# Patient Record
Sex: MALE | Race: WHITE | Employment: FULL TIME | URBAN - METROPOLITAN AREA
[De-identification: names, ages, dates, MRNs, and addresses within clinical notes are randomized per-mention and may not be internally consistent; named-entity substitution may affect disease eponyms.]

---

## 2022-07-21 ENCOUNTER — HOSPITAL ENCOUNTER (OUTPATIENT)
Age: 32
Setting detail: SPECIMEN
Discharge: HOME | End: 2022-07-21

## 2022-07-21 ENCOUNTER — OFFICE VISIT (OUTPATIENT)
Dept: FAMILY MEDICINE CLINIC | Age: 32
End: 2022-07-21
Payer: COMMERCIAL

## 2022-07-21 VITALS
DIASTOLIC BLOOD PRESSURE: 68 MMHG | BODY MASS INDEX: 19.93 KG/M2 | WEIGHT: 150.4 LBS | HEART RATE: 73 BPM | HEIGHT: 73 IN | SYSTOLIC BLOOD PRESSURE: 115 MMHG

## 2022-07-21 DIAGNOSIS — Z00.00 HEALTHCARE MAINTENANCE: ICD-10-CM

## 2022-07-21 DIAGNOSIS — L20.84 INTRINSIC ECZEMA: Primary | ICD-10-CM

## 2022-07-21 LAB
HEPATITIS C ANTIBODY: NONREACTIVE
HIV AG/AB: NONREACTIVE

## 2022-07-21 PROCEDURE — 99203 OFFICE O/P NEW LOW 30 MIN: CPT | Performed by: STUDENT IN AN ORGANIZED HEALTH CARE EDUCATION/TRAINING PROGRAM

## 2022-07-21 RX ORDER — TRIAMCINOLONE ACETONIDE 0.25 MG/G
OINTMENT TOPICAL 2 TIMES DAILY
Qty: 80 G | Refills: 1 | Status: SHIPPED | OUTPATIENT
Start: 2022-07-21 | End: 2022-07-28

## 2022-07-21 SDOH — ECONOMIC STABILITY: FOOD INSECURITY: WITHIN THE PAST 12 MONTHS, YOU WORRIED THAT YOUR FOOD WOULD RUN OUT BEFORE YOU GOT MONEY TO BUY MORE.: NEVER TRUE

## 2022-07-21 SDOH — ECONOMIC STABILITY: FOOD INSECURITY: WITHIN THE PAST 12 MONTHS, THE FOOD YOU BOUGHT JUST DIDN'T LAST AND YOU DIDN'T HAVE MONEY TO GET MORE.: NEVER TRUE

## 2022-07-21 ASSESSMENT — ENCOUNTER SYMPTOMS
SHORTNESS OF BREATH: 0
CONSTIPATION: 0
COUGH: 0
SINUS PRESSURE: 0
NAUSEA: 0
ABDOMINAL PAIN: 0
SINUS PAIN: 0
ROS SKIN COMMENTS: ECZEMA
VOMITING: 0
BACK PAIN: 0
WHEEZING: 0
DIARRHEA: 0
BLOOD IN STOOL: 0

## 2022-07-21 ASSESSMENT — PATIENT HEALTH QUESTIONNAIRE - PHQ9
SUM OF ALL RESPONSES TO PHQ QUESTIONS 1-9: 0
SUM OF ALL RESPONSES TO PHQ9 QUESTIONS 1 & 2: 0
2. FEELING DOWN, DEPRESSED OR HOPELESS: 0
SUM OF ALL RESPONSES TO PHQ QUESTIONS 1-9: 0
1. LITTLE INTEREST OR PLEASURE IN DOING THINGS: 0

## 2022-07-21 ASSESSMENT — SOCIAL DETERMINANTS OF HEALTH (SDOH): HOW HARD IS IT FOR YOU TO PAY FOR THE VERY BASICS LIKE FOOD, HOUSING, MEDICAL CARE, AND HEATING?: NOT HARD AT ALL

## 2022-07-21 NOTE — PROGRESS NOTES
Visit Information    Have you changed or started any medications since your last visit including any over-the-counter medicines, vitamins, or herbal medicines? no   Are you having any side effects from any of your medications? -  no  Have you stopped taking any of your medications? Is so, why? -  no    Have you seen any other physician or provider since your last visit? Yes - Records Requested  Have you had any other diagnostic tests since your last visit? No  Have you been seen in the emergency room and/or had an admission to a hospital since we last saw you? No  Have you had your routine dental cleaning in the past 6 months? no    Have you activated your [facility] account? If not, what are your barriers?  No:      Patient Care Team:  Lord Grady MD as PCP - General (Emergency Medicine)    Medical History Review  Past Medical, Family, and Social History reviewed and does not contribute to the patient presenting condition    Health Maintenance   Topic Date Due    Varicella vaccine (1 of 2 - 2-dose childhood series) Never done    Depression Screen  Never done    HIV screen  Never done    Hepatitis C screen  Never done    DTaP/Tdap/Td vaccine (1 - Tdap) Never done    COVID-19 Vaccine (3 - Booster for [place] series) 10/05/2021    Flu vaccine (1) 09/01/2022    Hepatitis A vaccine  Aged Out    Hepatitis B vaccine  Aged Out    Hib vaccine  Aged Out    Meningococcal (ACWY) vaccine  Aged Out    Pneumococcal 0-64 years Vaccine  Aged Out

## 2022-07-21 NOTE — PROGRESS NOTES
Attending Physician Statement  I have discussed the care of Aramis Munguia 33 y.o. male, including pertinent history and exam findings, with the resident Dr. Patricia Shah MD.    History and Exam:   Chief Complaint   Patient presents with    Annual Exam     New patient       No past medical history on file. No Known Allergies   I have seen and examined the patient and the key elements of the encounter have been performed by me. BP Readings from Last 3 Encounters:   07/21/22 115/68     /68 (Site: Left Upper Arm, Position: Sitting, Cuff Size: Medium Adult)   Pulse 73   Ht 6' 1\" (1.854 m)   Wt 150 lb 6.4 oz (68.2 kg)   BMI 19.84 kg/m²   No results found for: WBC, HGB, HCT, PLT, CHOL, TRIG, HDL, LDLDIRECT, ALT, AST, NA, K, CL, CREATININE, BUN, CO2, TSH, PSA, INR, GLUF, LABA1C, LABMICR  No results found for: LABPROT, LABALBU  No results found for: IRON, TIBC, FERRITIN  No results found for: LDLCALC, LDLCHOLESTEROL, LDLDIRECT  I agree with the assessment, plan and the diagnosis of    Diagnosis Orders   1. Intrinsic eczema  triamcinolone (KENALOG) 0.025 % ointment      2. Healthcare maintenance  Hepatitis C Antibody    HIV Screen       . I agree with orders as documented by the resident. More than 25 minutes spent  in face to face encounter with the patient and more than half in counseling. Patient's questions were answered. Patient Voiced understanding to the counseling. Return in about 6 months (around 1/21/2023) for Lab Work.    (GC Modifier)-Dr. Maci Mustafa MD

## 2022-07-21 NOTE — PROGRESS NOTES
Subjective: Littie Hammans is a 33 y.o. male with  has no past medical history on file. Presented to the office today for:  Chief Complaint   Patient presents with    Annual Exam     New patient       HPI  This is a 78-year-old gentleman with a history of eczema came in today to establish care. Patient recently moved from [facility] to [facility], works as a . Patient is asymptomatic, denied any scheduled medication/over-the-counter supplements, any allergies that he is familiar off. Patient denies any surgical history. Denies any family history of heart disease or diabetes. Patient is only using Kenalog for eczema. Review of Systems   Constitutional:  Negative for chills and fever. HENT:  Negative for congestion, sinus pressure and sinus pain. Respiratory:  Negative for cough, shortness of breath and wheezing. Cardiovascular:  Negative for chest pain, palpitations and leg swelling. Gastrointestinal:  Negative for abdominal pain, blood in stool, constipation, diarrhea, nausea and vomiting. Genitourinary:  Negative for difficulty urinating, flank pain and hematuria. Musculoskeletal:  Negative for arthralgias, back pain and myalgias. Skin:         Eczema   Neurological:  Negative for dizziness, light-headedness and headaches. Psychiatric/Behavioral:  Negative for agitation and confusion. ROS negative except what mentioned in HPI. The patient has a No family history on file. Objective:    /68 (Site: Left Upper Arm, Position: Sitting, Cuff Size: Medium Adult)   Pulse 73   Ht 6' 1\" (1.854 m)   Wt 150 lb 6.4 oz (68.2 kg)   BMI 19.84 kg/m²    BP Readings from Last 3 Encounters:   07/21/22 115/68       Physical Exam  Vitals and nursing note reviewed. Constitutional:       Appearance: Normal appearance.    HENT:      Mouth/Throat:      Mouth: Mucous membranes are moist.   Eyes:      Conjunctiva/sclera: Conjunctivae normal.   Cardiovascular:      Rate and Rhythm: Normal rate and regular rhythm. Pulmonary:      Effort: Pulmonary effort is normal.      Breath sounds: Normal breath sounds. Abdominal:      General: Bowel sounds are normal. There is no distension. Palpations: Abdomen is soft. There is no mass. Tenderness: There is no abdominal tenderness. There is no guarding. Musculoskeletal:      Right lower leg: No edema. Left lower leg: No edema. Skin:     Comments: Diffuse eczema on the extensor surface and around neck. Neurological:      General: No focal deficit present. Mental Status: He is alert and oriented to person, place, and time. Psychiatric:         Mood and Affect: Mood normal.         Behavior: Behavior normal.       No results found for: WBC, HGB, HCT, PLT, CHOL, TRIG, HDL, LDLDIRECT, ALT, AST, NA, K, CL, CREATININE, BUN, CO2, TSH, PSA, INR, GLUF, LABA1C, LABMICR  No results found for: CALCIUM, PHOS  No results found for: LDLCALC, LDLCHOLESTEROL, LDLDIRECT    Examination including this and mentioned above in HPI. Assessment and Plan:    1. Intrinsic eczema  -Refilled Kenalog, counseled the patient about more often use of steroid causing thinning of skin, will continue to monitor patient's symptoms. - triamcinolone (KENALOG) 0.025 % ointment; Apply topically 2 times daily for 7 days Apply topically 2 times daily. Dispense: 80 g; Refill: 1    2. Healthcare maintenance  - Hepatitis C Antibody; Future  - HIV Screen; Future    3. PHQ-9 is 0 during this encounter. Requested Prescriptions     Signed Prescriptions Disp Refills    triamcinolone (KENALOG) 0.025 % ointment 80 g 1     Sig: Apply topically 2 times daily for 7 days Apply topically 2 times daily. There are no discontinued medications.     Aramis received counseling on the following healthy behaviors: nutrition, exercise and medication adherence      Patient was counseled about importance of taking medication which were prescribed today and also which

## 2022-07-21 NOTE — PATIENT INSTRUCTIONS
Thank you for letting us take care of you today. We hope all your questions were addressed. If a question was overlooked or something else comes to mind after you return home, please contact a member of your Care Team listed below. Your Care Team at [place] [id number] is Team #5  Randell Jefferson MD (Faculty)  Kulwinder Harman MD (Resident)  Soni Kelly MD (Resident)  Sharon Vail MD (Resident)  Tylor Ramos MD, (Resident)  Warren Hodges., CMA  Pino Martinez., RMA  Rebecca Vail.,  LPN  Meenakshi Gore., [place]., [facility] office)  Estefanía Calzada (Clinical Practice Manager)  Yue Painter, [address] [place] [address] (Clinical Pharmacist)       Office phone number: [phone]    If you need to get in right away due to illness, please be advised we have \"Same Day\" appointments available Monday-Friday. Please call us at [phone] option #3 to schedule your \"Same Day\" appointment.

## 2022-08-20 PROBLEM — Z00.00 HEALTHCARE MAINTENANCE: Status: RESOLVED | Noted: 2022-07-21 | Resolved: 2022-08-20
